# Patient Record
Sex: MALE | Race: WHITE | NOT HISPANIC OR LATINO | Employment: OTHER | URBAN - METROPOLITAN AREA
[De-identification: names, ages, dates, MRNs, and addresses within clinical notes are randomized per-mention and may not be internally consistent; named-entity substitution may affect disease eponyms.]

---

## 2019-03-19 ENCOUNTER — OFFICE VISIT (OUTPATIENT)
Dept: NEUROLOGY | Facility: CLINIC | Age: 74
End: 2019-03-19
Payer: MEDICARE

## 2019-03-19 VITALS
DIASTOLIC BLOOD PRESSURE: 80 MMHG | SYSTOLIC BLOOD PRESSURE: 128 MMHG | BODY MASS INDEX: 26.06 KG/M2 | WEIGHT: 171.94 LBS | HEIGHT: 68 IN | HEART RATE: 62 BPM

## 2019-03-19 DIAGNOSIS — E78.49 OTHER HYPERLIPIDEMIA: ICD-10-CM

## 2019-03-19 DIAGNOSIS — G50.1 FACIAL PAIN, ATYPICAL: Primary | ICD-10-CM

## 2019-03-19 DIAGNOSIS — G62.9 NEUROPATHY: ICD-10-CM

## 2019-03-19 DIAGNOSIS — K08.89 PAIN IN A TOOTH OR TEETH: ICD-10-CM

## 2019-03-19 DIAGNOSIS — I10 HYPERTENSION, UNSPECIFIED TYPE: ICD-10-CM

## 2019-03-19 DIAGNOSIS — K14.6 GLOSSOPYROSIS: ICD-10-CM

## 2019-03-19 DIAGNOSIS — F33.41 RECURRENT MAJOR DEPRESSIVE DISORDER, IN PARTIAL REMISSION: ICD-10-CM

## 2019-03-19 DIAGNOSIS — G44.201 INTRACTABLE TENSION-TYPE HEADACHE, UNSPECIFIED CHRONICITY PATTERN: ICD-10-CM

## 2019-03-19 DIAGNOSIS — G44.091 OTHER INTRACTABLE TRIGEMINAL AUTONOMIC CEPHALGIA (TAC): ICD-10-CM

## 2019-03-19 PROBLEM — R51.9 HEADACHE: Status: ACTIVE | Noted: 2017-08-24

## 2019-03-19 PROCEDURE — 99205 OFFICE O/P NEW HI 60 MIN: CPT | Mod: S$PBB,,, | Performed by: PSYCHIATRY & NEUROLOGY

## 2019-03-19 PROCEDURE — 99999 PR PBB SHADOW E&M-NEW PATIENT-LVL III: ICD-10-PCS | Mod: PBBFAC,,, | Performed by: PSYCHIATRY & NEUROLOGY

## 2019-03-19 PROCEDURE — 99999 PR PBB SHADOW E&M-NEW PATIENT-LVL III: CPT | Mod: PBBFAC,,, | Performed by: PSYCHIATRY & NEUROLOGY

## 2019-03-19 PROCEDURE — 99205 PR OFFICE/OUTPT VISIT, NEW, LEVL V, 60-74 MIN: ICD-10-PCS | Mod: S$PBB,,, | Performed by: PSYCHIATRY & NEUROLOGY

## 2019-03-19 PROCEDURE — 99203 OFFICE O/P NEW LOW 30 MIN: CPT | Mod: PBBFAC | Performed by: PSYCHIATRY & NEUROLOGY

## 2019-03-19 RX ORDER — ASPIRIN 81 MG/1
81 TABLET ORAL
COMMUNITY

## 2019-03-19 RX ORDER — ZOLPIDEM TARTRATE 10 MG/1
TABLET ORAL
COMMUNITY

## 2019-03-19 RX ORDER — BACLOFEN 10 MG/1
10 TABLET ORAL 3 TIMES DAILY
Qty: 90 TABLET | Refills: 5 | Status: SHIPPED | OUTPATIENT
Start: 2019-03-19

## 2019-03-19 RX ORDER — OMEPRAZOLE 20 MG/1
20 CAPSULE, DELAYED RELEASE ORAL
COMMUNITY

## 2019-03-19 RX ORDER — CARBAMAZEPINE 200 MG/1
200 TABLET ORAL 3 TIMES DAILY
Qty: 90 TABLET | Refills: 5 | Status: SHIPPED | OUTPATIENT
Start: 2019-03-19

## 2019-03-19 RX ORDER — HYDROCHLOROTHIAZIDE 12.5 MG/1
TABLET ORAL
COMMUNITY

## 2019-03-19 RX ORDER — LAMOTRIGINE 25 MG/1
25 TABLET ORAL 3 TIMES DAILY
Qty: 90 TABLET | Refills: 5 | Status: SHIPPED | OUTPATIENT
Start: 2019-03-19

## 2019-03-19 RX ORDER — PRAVASTATIN SODIUM 40 MG/1
40 TABLET ORAL
COMMUNITY

## 2019-03-19 NOTE — PROGRESS NOTES
Subjective:       Patient ID: Harsh Alexander is a 73 y.o. male.    Chief Complaint: Facial Pain        HPI     The patient started having teeth pain in 2016 with occasional eye pain as well. Numerous dental and neurological studies have not disclosed any abnormalities including 2 brain MRIs. He failed numerous pain medications including CBZ, OXC, GBP, PGB, Baclofen and NSAIDs/Steroids and Tramadol. The pain is 4-5/10, resolves with .      Review of Systems   Constitutional: Negative for appetite change and fatigue.   HENT: Negative for hearing loss and tinnitus.    Eyes: Negative for photophobia and visual disturbance.   Respiratory: Negative for apnea and shortness of breath.    Cardiovascular: Negative for chest pain and palpitations.   Gastrointestinal: Negative for nausea and vomiting.   Endocrine: Negative for cold intolerance and heat intolerance.   Genitourinary: Negative for difficulty urinating and urgency.   Musculoskeletal: Positive for neck pain. Negative for arthralgias, back pain, gait problem, joint swelling, myalgias and neck stiffness.   Skin: Negative for color change and rash.   Allergic/Immunologic: Negative for environmental allergies and immunocompromised state.   Neurological: Negative for dizziness, tremors, seizures, syncope, facial asymmetry, speech difficulty, weakness, light-headedness, numbness and headaches.   Hematological: Negative for adenopathy. Does not bruise/bleed easily.   Psychiatric/Behavioral: Negative for agitation, behavioral problems, confusion, decreased concentration, dysphoric mood, hallucinations, self-injury, sleep disturbance and suicidal ideas. The patient is not nervous/anxious and is not hyperactive.          Current Outpatient Medications:     aspirin (ECOTRIN) 81 MG EC tablet, Take 81 mg by mouth., Disp: , Rfl:     hydroCHLOROthiazide (HYDRODIURIL) 12.5 MG Tab, hydrochlorothiazide 12.5 mg tablet, Disp: , Rfl:     omeprazole (PRILOSEC) 20 MG capsule,  Take 20 mg by mouth., Disp: , Rfl:     pravastatin (PRAVACHOL) 40 MG tablet, Take 40 mg by mouth., Disp: , Rfl:     zolpidem (AMBIEN) 10 mg Tab, zolpidem 10 mg tablet, Disp: , Rfl:     baclofen (LIORESAL) 10 MG tablet, Take 1 tablet (10 mg total) by mouth 3 (three) times daily., Disp: 90 tablet, Rfl: 5    carBAMazepine (TEGRETOL) 200 mg tablet, Take 1 tablet (200 mg total) by mouth 3 (three) times daily., Disp: 90 tablet, Rfl: 5    lamoTRIgine (LAMICTAL) 25 MG tablet, Take 1 tablet (25 mg total) by mouth 3 (three) times daily., Disp: 90 tablet, Rfl: 5  Past Medical History:   Diagnosis Date    Hypertension      Past Surgical History:   Procedure Laterality Date    cataract surgery      ELBOW SURGERY      GALLBLADDER SURGERY      NECK SURGERY       Social History     Socioeconomic History    Marital status: Unknown     Spouse name: Not on file    Number of children: Not on file    Years of education: Not on file    Highest education level: Not on file   Social Needs    Financial resource strain: Not on file    Food insecurity - worry: Not on file    Food insecurity - inability: Not on file    Transportation needs - medical: Not on file    Transportation needs - non-medical: Not on file   Occupational History    Not on file   Tobacco Use    Smoking status: Never Smoker    Smokeless tobacco: Never Used   Substance and Sexual Activity    Alcohol use: Yes     Comment: occ    Drug use: Not on file    Sexual activity: Not on file   Other Topics Concern    Not on file   Social History Narrative    Not on file       Objective:     Vital signs reviewed     GENERAL APPEARANCE:     The patient looks comfortable.    No signs of medical or psychiatric distress.    Normal breathing pattern.    No dysmorphic features    Normal eye contact.     GENERAL MEDICAL EXAM:    HEENT:  Head is atraumatic normocephalic. No tender temporal arteries.     Neck and Axillae: No JVD. No carotid bruits. No thyromegaly. No  lymphadenopathy.    Cardiopulmonary: No cyanosis. No tachypnea. Normal respiratory effort.  Clear breath sounds. Normal heart sounds with regular rhythm and no murmurs.    Gastrointestinal:  No stomas or lesions. No hernias.  Abdomen is soft non-tender. No masses or organomegaly.    Skin, Hair and Nails: No pathognonomic skin rash. No neurofibromatosis.   No stigmata of autoimmune disease.     Limbs: No varicose veins. No edema. Symmetric pulses.     Muskoskeletal: No deformities.No spine tenderness.   No signs of longstanding neuropathy. No dislocations or fractures.        Neurologic Exam     Mental Status   Oriented to person, place, and time.   Registration: recalls 3 of 3 objects. Recall at 5 minutes: recalls 3 of 3 objects. Follows 3 step commands.   Attention: normal. Concentration: normal.   Speech: speech is normal   Level of consciousness: alert  Knowledge: good and consistent with education. Able to perform simple calculations.   Able to name object. Able to read. Able to repeat. Able to write. Normal comprehension.     Cranial Nerves     CN II   Visual fields full to confrontation.   Visual acuity: normal  Right visual field deficit: none  Left visual field deficit: none     CN III, IV, VI   Pupils are equal, round, and reactive to light.  Extraocular motions are normal.   Right pupil: Size: 2 mm. Shape: regular. Reactivity: brisk. Consensual response: intact. Accommodation: intact.   Left pupil: Size: 2 mm. Shape: regular. Reactivity: brisk. Consensual response: intact. Accommodation: intact.   CN III: no CN III palsy  CN VI: no CN VI palsy  Nystagmus: none   Diplopia: none  Ophthalmoparesis: none  Upgaze: normal  Downgaze: normal  Conjugate gaze: present  Vestibulo-ocular reflex: present    CN V   Facial sensation intact.   Right facial sensation deficit: none  Left facial sensation deficit: none  Right corneal reflex: normal  Left corneal reflex: normal    CN VII   Right facial weakness: none  Left  facial weakness: none  Right taste: normal  Left taste: normal    CN VIII   CN VIII normal.   Hearing: intact  Right Rinne: AC > BC  Left Rinne: AC > BC  Jernigan: does not lateralize     CN IX, X   CN IX normal.   CN X normal.   Palate: symmetric  Right gag reflex: normal  Left gag reflex: normal    CN XI   CN XI normal.   Right sternocleidomastoid strength: normal  Left sternocleidomastoid strength: normal  Right trapezius strength: normal  Left trapezius strength: normal    CN XII   CN XII normal.   Tongue: not atrophic  Fasciculations: absent  Tongue deviation: none    Motor Exam   Muscle bulk: normal  Overall muscle tone: normal  Right arm tone: normal  Left arm tone: normal  Right arm pronator drift: absent  Left arm pronator drift: absent  Right leg tone: normal  Left leg tone: normal    Strength   Strength 5/5 throughout.   Right neck flexion: 5/5  Left neck flexion: 5/5  Right neck extension: 5/5  Left neck extension: 5/5  Right deltoid: 5/5  Left deltoid: 5/5  Right biceps: 5/5  Left biceps: 5/5  Right triceps: 5/5  Left triceps: 5/5  Right wrist flexion: 5/5  Left wrist flexion: 5/5  Right wrist extension: 5/5  Left wrist extension: 5/5  Right interossei: 5/5  Left interossei: 5/5  Right abdominals: 5/5  Left abdominals: 5/5  Right iliopsoas: 5/5  Left iliopsoas: 5/5  Right quadriceps: 5/5  Left quadriceps: 5/5  Right hamstrin/5  Left hamstrin/5  Right glutei: 5/5  Left glutei: 5/5  Right anterior tibial: 5/5  Left anterior tibial: 5/5  Right posterior tibial: 5/5  Left posterior tibial: 5/5  Right peroneal: 5/5  Left peroneal: 5/5  Right gastroc: 5/5  Left gastroc: 5/5    Sensory Exam   Light touch normal.   Right arm light touch: normal  Left arm light touch: normal  Right leg light touch: normal  Left leg light touch: normal  Vibration normal.   Right arm vibration: normal  Left arm vibration: normal  Right leg vibration: normal  Left leg vibration: normal  Proprioception normal.   Right arm  proprioception: normal  Left arm proprioception: normal  Right leg proprioception: normal  Left leg proprioception: normal  Pinprick normal.   Right arm pinprick: normal  Left arm pinprick: normal  Right leg pinprick: normal  Left leg pinprick: normal  Graphesthesia: normal  Stereognosis: normal    Gait, Coordination, and Reflexes     Gait  Gait: normal    Coordination   Romberg: negative  Finger to nose coordination: normal  Heel to shin coordination: normal  Tandem walking coordination: normal    Tremor   Resting tremor: absent  Intention tremor: absent  Action tremor: absent    Reflexes   Right brachioradialis: 2+  Left brachioradialis: 2+  Right biceps: 2+  Left biceps: 2+  Right triceps: 2+  Left triceps: 2+  Right patellar: 2+  Left patellar: 2+  Right achilles: 2+  Left achilles: 2+  Right : 2+  Left : 2+  Right plantar: normal  Left plantar: normal  Right Clark: absent  Left Clark: absent  Right ankle clonus: absent  Left ankle clonus: absent  Right pendular knee jerk: absent  Left pendular knee jerk: absent      Reviewed records [id number]    Brain MRIs X2 Unremarkable     Reviewed the neuroimaging independently       Assessment:       1. Facial pain, atypical    2. Hypertension, unspecified type    3. Other hyperlipidemia    4. Neuropathy    5. Intractable tension-type headache, unspecified chronicity pattern    6. Glossopyrosis    7. Recurrent major depressive disorder, in partial remission    8. Pain in a tooth or teeth    9. Other intractable trigeminal autonomic cephalgia (TAC)        Plan:             ATYPICAL NEURALGIA/FACIAL PAIN     As a very last resort before trying Gamma Knife will try a combination of :     mg TID, LTG 25 mg TID and Baclofen 10 mg TID with slow titration and watch for sedation.    The patient was encouraged to maintain sedation precautions which include but not limited to avoid driving, avoid high altitudes, avoid being close to fire or fire source, avoid  being close to a body of water or swimming alone, avoid operating heavy machinery and avoid using sharp objects if possible. The patient was encouraged to shower (without accumulation of water) instead of taking a bath if unsupervised. The patient was made aware that these precautions are especially important during concurrent illness. The patient was also advised not to care for children without company. The patient was advised to pad the side rails with pillows and blankets if applicable. The patient was encouraged to avoid safety sensitive duties.         MEDICAL/SURGICAL COMORBIDITIES     All relevant medical comorbidities noted and managed by primary care physician and medical care team.          I spent 65 minutes face to face with the patient    More than 35  minutes of the time spent in counseling and coordination of care including discussions etiology of diagnosis, pathonogenesis of diagnosis, prognosis of diagnosis,, diagnostic results, impression and recommendations, diagnostic studies, management, risks and benefits of treatment, instructions of disease self management, treatment instructions, follow up requirements, patient and family counseling/involvement in care compliance with treatment regimen. All of the patient's questions were answered during this discussion.      RTC in 8 weeks

## 2019-03-19 NOTE — PATIENT INSTRUCTIONS
Carbamazepine tablets  What is this medicine?  CARBAMAZEPINE (dudley arti child reg) is used to control seizures caused by certain types of epilepsy. This medicine is also used to treat nerve related pain. It is not for common aches and pains.  How should I use this medicine?  Take this medicine by mouth with a glass of water. Follow the directions on the prescription label. Take this medicine with food. Take your doses at regular intervals. Do not take your medicine more often than directed. Do not stop taking this medicine except on the advice of your doctor or health care professional.  A special MedGuide will be given to you by the pharmacist with each prescription and refill. Be sure to read this information carefully each time.  Talk to your pediatrician regarding the use of this medicine in children. Special care may be needed.  What side effects may I notice from receiving this medicine?  Side effects that you should report to your doctor or health care professional as soon as possible:  · allergic reactions like skin rash, itching or hives, swelling of the face, lips, or tongue  · breathing problems  · changes in vision  · confusion  · dark urine  · fast or irregular heartbeat  · fever or chills, sore throat  · mouth ulcers  · pain or difficulty passing urine  · redness, blistering, peeling or loosening of the skin, including inside the mouth  · ringing in the ears  · seizures  · stomach pain  · swollen joints or muscle/joint aches and pains  · unusual bleeding or bruising  · unusually weak or tired  · vomiting  · worsening of mood, thoughts or actions of suicide or dying  · yellowing of the eyes or skin  Side effects that usually do not require medical attention (report to your doctor or health care professional if they continue or are bothersome):  · clumsiness or unsteadiness  · diarrhea or constipation  · headache  · increased sweating  · nausea  What may interact with this medicine?  Do not take this  medicine with any of the following medications:  · certain medicines used to treat HIV infection or AIDS that are given in combination with cobicistat  · delavirdine  · MAOIs like Carbex, Eldepryl, Marplan, Nardil, and Parnate  · nefazodone  · oxcarbazepine  This medicine may also interact with the following medications:  · acetaminophen  · acetazolamide  · barbiturate medicines for inducing sleep or treating seizures, like phenobarbital  · certain antibiotics like clarithromycin, erythromycin or troleandomycin  · cimetidine  · cyclosporine  · danazol  · dicumarol  · doxycycline  · female hormones, including estrogens and birth control pills  · grapefruit juice  · isoniazid, INH  · levothyroxine and other thyroid hormones  · lithium and other medicines to treat mood problems or psychotic disturbances  · loratadine  · medicines for angina or high blood pressure  · medicines for cancer  · medicines for depression or anxiety  · medicines for sleep  · medicines to treat fungal infections, like fluconazole, itraconazole or ketoconazole  · medicines used to treat HIV infection or AIDS  · methadone  · niacinamide  · praziquantel  · propoxyphene  · rifampin or rifabutin  · seizure or epilepsy medicine  · steroid medicines such as prednisone or cortisone  · theophylline  · tramadol  · warfarin  What if I miss a dose?  If you miss a dose, take it as soon as you can. If it is almost time for your next dose, take only that dose. Do not take double or extra doses.  Where should I keep my medicine?  Keep out of reach of children.  Store at room temperature below 30 degrees C (86 degrees F). Keep container tightly closed. Protect from moisture. Throw away any unused medicine after the expiration date.  What should I tell my health care provider before I take this medicine?  They need to know if you have any of these conditions:  ·  ancestry  · bone marrow disease  · glaucoma  · heart disease or irregular heartbeat  · kidney  disease  · liver disease  · low blood counts, like low white cell, platelet, or red cell counts  · porphyria  · psychotic disorders  · suicidal thoughts, plans, or attempt; a previous suicide attempt by you or a family member  · an unusual or allergic reaction to carbamazepine, tricyclic antidepressants, phenytoin, phenobarbital or other medicines, foods, dyes, or preservatives  · pregnant or trying to get pregnant  · breast-feeding  What should I watch for while using this medicine?  Visit your doctor or health care professional for a regular check on your progress. Do not change brands or dosage forms of this medicine without discussing the change with your doctor or health care professional. If you are taking this medicine for epilepsy (seizures) do not stop taking it suddenly. This increases the risk of seizures. Wear a Medic Alert bracelet or necklace. Carry an identification card with information about your condition, medications, and doctor or health care professional.  You may get drowsy, dizzy, or have blurred vision. Do not drive, use machinery, or do anything that needs mental alertness until you know how this medicine affects you. To reduce dizzy or fainting spells, do not sit or stand up quickly, especially if you are an older patient. Alcohol can increase drowsiness and dizziness. Avoid alcoholic drinks.  Birth control pills may not work properly while you are taking this medicine. Talk to your doctor about using an extra method of birth control.  This medicine can make you more sensitive to the sun. Keep out of the sun. If you cannot avoid being in the sun, wear protective clothing and use sunscreen. Do not use sun lamps or tanning beds/booths.  The use of this medicine may increase the chance of suicidal thoughts or actions. Pay special attention to how you are responding while on this medicine. Any worsening of mood, or thoughts of suicide or dying should be reported to your health care professional  right away.  Women who become pregnant while using this medicine may enroll in the [place] Antiepileptic Drug Pregnancy Registry by calling 1-702.682.1024. This registry collects information about the safety of antiepileptic drug use during pregnancy.  NOTE:This sheet is a summary. It may not cover all possible information. If you have questions about this medicine, talk to your doctor, pharmacist, or health care provider. Copyright© 2017 [employer]        Lamotrigine tablets  What is this medicine?  LAMOTRIGINE (la ALEXX tri jeen) is used to control seizures in adults and children with epilepsy and Lennox-Gastaut syndrome. It is also used in adults to treat bipolar disorder.  How should I use this medicine?  Take this medicine by mouth with a glass of water. Follow the directions on the prescription label. Do not chew these tablets. If this medicine upsets your stomach, take it with food or milk. Take your doses at regular intervals. Do not take your medicine more often than directed.  A special MedGuide will be given to you by the pharmacist with each new prescription and refill. Be sure to read this information carefully each time.  Talk to your pediatrician regarding the use of this medicine in children. While this drug may be prescribed for children as young as 2 years for selected conditions, precautions do apply.  What side effects may I notice from receiving this medicine?  Side effects you should report to your doctor or health care professional as soon as possible:  · allergic reactions like skin rash, itching or hives, swelling of the face, lips, or tongue  · blurred or double vision  · difficulty walking or controlling muscle movements  · fever  · headache, stiff neck, and sensitivity to light  · painful sores in the mouth, eyes, or nose  · redness, blistering, peeling or loosening of the skin, including inside the mouth  · severe muscle pain  · swollen lymph glands  · uncontrollable eye  movements  · unusual bruising or bleeding  · unusually weak or tired  · vomiting  · worsening of mood, thoughts or actions of suicide or dying  · yellowing of the eyes or skin  Side effects that usually do not require medical attention (report to your doctor or health care professional if they continue or are bothersome):  · diarrhea or constipation  · difficulty sleeping  · nausea  · tremors  What may interact with this medicine?  · carbamazepine  · female hormones, including contraceptive or birth control pills  · methotrexate  · phenobarbital  · phenytoin  · primidone  · pyrimethamine  · rifampin  · trimethoprim  · valproic acid  What if I miss a dose?  If you miss a dose, take it as soon as you can. If it is almost time for your next dose, take only that dose. Do not take double or extra doses.  Where should I keep my medicine?  Keep out of reach of children.  Store at room temperature between 15 and 30 degrees C (59 and 86 degrees F). Throw away any unused medicine after the expiration date.  What should I tell my health care provider before I take this medicine?  They need to know if you have any of these conditions:  · a history of depression or bipolar disorder  · aseptic meningitis during prior use of lamotrigine  · folate deficiency  · kidney disease  · liver disease  · suicidal thoughts, plans, or attempt; a previous suicide attempt by you or a family member  · an unusual or allergic reaction to lamotrigine or other seizure medications, other medicines, foods, dyes, or preservatives  · pregnant or trying to get pregnant  · breast-feeding  What should I watch for while using this medicine?  Visit your doctor or health care professional for regular checks on your progress. If you take this medicine for seizures, wear a Medic Alert bracelet or necklace. Carry an identification card with information about your condition, medicines, and doctor or health care professional.  It is important to take this medicine  exactly as directed. When first starting treatment, your dose will need to be adjusted slowly. It may take weeks or months before your dose is stable. You should contact your doctor or health care professional if your seizures get worse or if you have any new types of seizures. Do not stop taking this medicine unless instructed by your doctor or health care professional. Stopping your medicine suddenly can increase your seizures or their severity.  Contact your doctor or health care professional right away if you develop a rash while taking this medicine. Rashes may be very severe and sometimes require treatment in the hospital. Deaths from rashes have occurred. Serious rashes occur more often in children than adults taking this medicine. It is more common for these serious rashes to occur during the first 2 months of treatment, but a rash can occur at any time.  You may get drowsy, dizzy, or have blurred vision. Do not drive, use machinery, or do anything that needs mental alertness until you know how this medicine affects you. To reduce dizzy or fainting spells, do not sit or stand up quickly, especially if you are an older patient. Alcohol can increase drowsiness and dizziness. Avoid alcoholic drinks.  If you are taking this medicine for bipolar disorder, it is important to report any changes in your mood to your doctor or health care professional. If your condition gets worse, you get mentally depressed, feel very hyperactive or manic, have difficulty sleeping, or have thoughts of hurting yourself or committing suicide, you need to get help from your health care professional right away. If you are a caregiver for someone taking this medicine for bipolar disorder, you should also report these behavioral changes right away. The use of this medicine may increase the chance of suicidal thoughts or actions. Pay special attention to how you are responding while on this medicine.  Your mouth may get dry. Chewing  sugarless gum or sucking hard candy, and drinking plenty of water may help. Contact your doctor if the problem does not go away or is severe.  Women who become pregnant while using this medicine may enroll in the [place] Antiepileptic Drug Pregnancy Registry by calling 1-444.316.6825. This registry collects information about the safety of antiepileptic drug use during pregnancy.  NOTE:This sheet is a summary. It may not cover all possible information. If you have questions about this medicine, talk to your doctor, pharmacist, or health care provider. Copyright© 2017 [employer]        Baclofen tablets  What is this medicine?  BACLOFEN (KILO enrique fen) helps relieve spasms and cramping of muscles. It may be used to treat symptoms of multiple sclerosis or spinal cord injury.  How should I use this medicine?  Take this medicine by mouth. Swallow it with a drink of water. Follow the directions on the prescription label. Do not take more medicine than you are told to take.  Talk to your pediatrician regarding the use of this medicine in children. Special care may be needed.  What side effects may I notice from receiving this medicine?  Side effects that you should report to your doctor or health care professional as soon as possible:  · allergic reactions like skin rash, itching or hives, swelling of the face, lips, or tongue  · breathing problems  · changes in emotions or moods  · changes in vision  · chest pain  · fast, irregular heartbeat  · feeling faint or lightheaded, falls  · hallucinations  · loss of balance or coordination  · ringing of the ears  · seizures  · trouble passing urine or change in the amount of urine  · trouble walking  · unusually weak or tired  Side effects that usually do not require medical attention (report to your doctor or health care professional if they continue or are bothersome):  · changes in taste  · confusion  · constipation  · diarrhea  · dry mouth  · headache  · muscle  weakness  · nausea, vomiting  · trouble sleeping  What may interact with this medicine?  Do not take this medication with any of the following medicines:  · narcotic medicines for cough  This medicine may also interact with the following medications:  · alcohol  · antihistamines for allergy, cough and cold  · certain medicines for anxiety or sleep  · certain medicines for depression like amitriptyline, fluoxetine, sertraline  · certain medicines for seizures like phenobarbital, primidone  · general anesthetics like halothane, isoflurane, methoxyflurane, propofol  · local anesthetics like lidocaine, pramoxine, tetracaine  · medicines that relax muscles for surgery  · narcotic medicines for pain  · phenothiazines like chlorpromazine, mesoridazine, prochlorperazine, thioridazine  What if I miss a dose?  If you miss a dose, take it as soon as you can. If it is almost time for your next dose, take only that dose. Do not take double or extra doses.  Where should I keep my medicine?  Keep out of the reach of children.  Store at room temperature between 15 and 30 degrees C (59 and 86 degrees F). Keep container tightly closed. Throw away any unused medicine after the expiration date.  What should I tell my health care provider before I take this medicine?  They need to know if you have any of these conditions:  · kidney disease  · seizures  · stroke  · an unusual or allergic reaction to baclofen, other medicines, foods, dyes, or preservatives  · pregnant or trying to get pregnant  · breast-feeding  What should I watch for while using this medicine?  Tell your doctor or health care professional if your symptoms do not start to get better or if they get worse.  Do not suddenly stop taking your medicine. If you do, you may develop a severe reaction. If your doctor wants you to stop the medicine, the dose will be slowly lowered over time to avoid any side effects. Follow the advice of your doctor.  You may get drowsy or dizzy.  Do not drive, use machinery, or do anything that needs mental alertness until you know how this medicine affects you. Do not stand or sit up quickly, especially if you are an older patient. This reduces the risk of dizzy or fainting spells. Alcohol may interfere with the effect of this medicine. Avoid alcoholic drinks.  If you are taking another medicine that also causes drowsiness, you may have more side effects. Give your health care provider a list of all medicines you use. Your doctor will tell you how much medicine to take. Do not take more medicine than directed. Call emergency for help if you have problems breathing or unusual sleepiness.  NOTE:This sheet is a summary. It may not cover all possible information. If you have questions about this medicine, talk to your doctor, pharmacist, or health care provider. Copyright© 2017 [employer]

## 2019-03-19 NOTE — LETTER
March 19, 2019      Baltazar Manzano III, MD  [address] [facility]  [place] [address]           [facility]  [id number] [place] [id number]  Phone: [phone]  Fax: [phone]          Patient: Harsh Alexander   MR Number: [id number]   YOB: 1945   Date of Visit: 3/19/2019       Dear Dr. Baltazar Manzano III:    Thank you for referring Harsh Alexander to me for evaluation. Attached you will find relevant portions of my assessment and plan of care.    If you have questions, please do not hesitate to call me. I look forward to following Harsh Alexander along with you.    Sincerely,    Mick Amato MD    Enclosure  CC:  No Recipients    If you would like to receive this communication electronically, please contact [email or web address] or [phone] to request more information on [facility] access.    For providers and/or their staff who would like to refer a patient to [facility], please contact us through our one-stop-shop provider referral line, [facility], at [phone].    If you feel you have received this communication in error or would no longer like to receive these types of communications, please e-mail [email or web address]

## 2019-03-22 ENCOUNTER — TELEPHONE (OUTPATIENT)
Dept: NEUROLOGY | Facility: CLINIC | Age: 74
End: 2019-03-22

## 2019-03-22 NOTE — TELEPHONE ENCOUNTER
----- Message from Efren Galvez sent at 3/22/2019 11:50 AM CDT -----  Contact: pt   Pt states having some difficulty adjusting to medication, messing with vision and has upset stomach. Pt making sure it is ok to adjust amounts he is taking.         ..[phone] (home)

## 2019-04-23 ENCOUNTER — PATIENT MESSAGE (OUTPATIENT)
Dept: NEUROLOGY | Facility: CLINIC | Age: 74
End: 2019-04-23

## 2019-04-29 ENCOUNTER — TELEPHONE (OUTPATIENT)
Dept: NEUROLOGY | Facility: CLINIC | Age: 74
End: 2019-04-29

## 2019-04-29 DIAGNOSIS — G50.0 FACIAL PAIN SYNDROME: Primary | ICD-10-CM

## 2019-04-29 NOTE — TELEPHONE ENCOUNTER
----- Message from Efren Galvez sent at 4/29/2019 10:11 AM CDT -----  Contact: pt   .Type:  Patient Requesting Referral    Who Called: pt   Does the patient already have the specialty appointment scheduled?: no   If yes, what is the date of that appointment?: n/a   Referral to What Specialty: pain management   Reason for Referral: facial pain   Does the patient want the referral with a specific physician?: Isauro Rivera [phone]  Is the specialist an [facility]?: [facility]   Patient Requesting a Response?: yes  Would the patient rather a call back or a response via [facility]?  [facility]   Best Call Back Number: [phone]  Additional Information:             [phone]

## 2022-04-12 ENCOUNTER — HISTORICAL (OUTPATIENT)
Dept: ADMINISTRATIVE | Facility: HOSPITAL | Age: 77
End: 2022-04-12
Payer: MEDICARE

## 2022-04-30 VITALS
HEIGHT: 69 IN | BODY MASS INDEX: 26.12 KG/M2 | SYSTOLIC BLOOD PRESSURE: 122 MMHG | WEIGHT: 176.38 LBS | DIASTOLIC BLOOD PRESSURE: 64 MMHG